# Patient Record
Sex: FEMALE | Race: ASIAN | NOT HISPANIC OR LATINO | URBAN - METROPOLITAN AREA
[De-identification: names, ages, dates, MRNs, and addresses within clinical notes are randomized per-mention and may not be internally consistent; named-entity substitution may affect disease eponyms.]

---
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
## 2021-06-08 NOTE — ED PEDIATRIC NURSE NOTE - CAS EDP DISCH TYPE
Appointment schedule with dr. perdomo for 6/22.  
New Appointment Needed  What is the reason for the visit:    Same Date/Next Day Appt Request  What is the reason for your visit?: Patient is in need of a diabetic check, the patient's sibling also just passed away last week, and the patient is worried about having the same cancer as their sibling. Also the patient stated they have really bad itching on their buttocks that will not stop. Patient would also like to have their BP checked.    Provider Preference: PCP only  How soon do you need to be seen?: as soon as possible  Waitlist offered?: No  Okay to leave a detailed message:  Yes    
Home

## 2024-03-22 ENCOUNTER — NON-APPOINTMENT (OUTPATIENT)
Age: 24
End: 2024-03-22

## 2024-04-01 ENCOUNTER — NON-APPOINTMENT (OUTPATIENT)
Age: 24
End: 2024-04-01

## 2024-04-01 ENCOUNTER — APPOINTMENT (OUTPATIENT)
Dept: GYNECOLOGIC ONCOLOGY | Facility: CLINIC | Age: 24
End: 2024-04-01
Payer: COMMERCIAL

## 2024-04-01 VITALS
WEIGHT: 103 LBS | DIASTOLIC BLOOD PRESSURE: 62 MMHG | OXYGEN SATURATION: 96 % | HEART RATE: 71 BPM | HEIGHT: 61 IN | SYSTOLIC BLOOD PRESSURE: 111 MMHG | TEMPERATURE: 97.5 F | BODY MASS INDEX: 19.45 KG/M2

## 2024-04-01 DIAGNOSIS — Z13.1 ENCOUNTER FOR SCREENING FOR DIABETES MELLITUS: ICD-10-CM

## 2024-04-01 DIAGNOSIS — Z01.818 ENCOUNTER FOR OTHER PREPROCEDURAL EXAMINATION: ICD-10-CM

## 2024-04-01 PROCEDURE — 99205 OFFICE O/P NEW HI 60 MIN: CPT

## 2024-04-01 NOTE — CHIEF COMPLAINT
[FreeTextEntry1] : New consult  23yo referred by Dr. Herring for a large dermoid cyst. Pt was c/o vagina l pruritis and had an incidental finding of ovarian cyst on routine US. denies abdominal pain, changes in bowel/urinary habits/bloating or decrease in appetite.    OBHX: none GYNHX: LMP 3/5/24. regular. STDs neg. denies abnormal pap, fibroids. + cysts  PMHX: none SX: none  MED: none ALL: NKDA  SOCIAL: social ETOH, no tobacco/drugs FAM: FGM: leukemia age 70's.   Last pap (no HPV testing): 10/2023- negative

## 2024-04-01 NOTE — PHYSICAL EXAM
[Chaperone Present] : A chaperone was present in the examining room during all aspects of the physical examination [Abnormal] : Adnexa(ae): Abnormal [Normal] : Recto-Vaginal Exam: Normal [de-identified] : 10cm ovarian cyst palpated anterior to the uterus. Nontender

## 2024-04-01 NOTE — HISTORY OF PRESENT ILLNESS
[FreeTextEntry1] : Problem: 1) LO cyst: 10cm likely Dermoid 2) AFP, CA 19-9, :  wnl  Previous Therapies; 1) TVUS 11/18/2023     a) Uterus 6.4 x 3.7 x 5.0cm EMS 0.5cm     b) RO 4.7cm     c) LO: ovary not identified. cystic mass with sizable soft tissue mural nodule with internal vascular flow 10.9 x 9.5 x 8.9cm  2) Pelvic MRI 12/24/23      a) anterior pelvis is a 10.3 x 9 x 8cm mass surrounded by a rind of ovarian tissue arising from LO containing macroscopic fat and hemorrhagic/proteinaceous material c/w Dermoid.   3) TVUS 3/22/24     a) Uterus 7.9 x 4.1cm     b) EMS 0.86cm     c) RO 4.4cm     d) LO: 10.8cm Dermoid cyst

## 2024-04-02 LAB
ALBUMIN SERPL ELPH-MCNC: 4.8 G/DL
ALP BLD-CCNC: 63 U/L
ALT SERPL-CCNC: 10 U/L
ANION GAP SERPL CALC-SCNC: 10 MMOL/L
APTT BLD: 35.6 SEC
AST SERPL-CCNC: 17 U/L
BASOPHILS # BLD AUTO: 0.04 K/UL
BASOPHILS NFR BLD AUTO: 0.8 %
BILIRUB SERPL-MCNC: 0.3 MG/DL
BUN SERPL-MCNC: 11 MG/DL
CALCIUM SERPL-MCNC: 9.6 MG/DL
CHLORIDE SERPL-SCNC: 103 MMOL/L
CO2 SERPL-SCNC: 26 MMOL/L
CREAT SERPL-MCNC: 0.58 MG/DL
EGFR: 130 ML/MIN/1.73M2
EOSINOPHIL # BLD AUTO: 0.15 K/UL
EOSINOPHIL NFR BLD AUTO: 3.1 %
ESTIMATED AVERAGE GLUCOSE: 114 MG/DL
GLUCOSE SERPL-MCNC: 92 MG/DL
HBA1C MFR BLD HPLC: 5.6 %
HCT VFR BLD CALC: 41.1 %
HGB BLD-MCNC: 13 G/DL
IMM GRANULOCYTES NFR BLD AUTO: 0 %
INR PPP: 1.08 RATIO
LYMPHOCYTES # BLD AUTO: 1.6 K/UL
LYMPHOCYTES NFR BLD AUTO: 32.8 %
MAN DIFF?: NORMAL
MCHC RBC-ENTMCNC: 27.7 PG
MCHC RBC-ENTMCNC: 31.6 GM/DL
MCV RBC AUTO: 87.4 FL
MONOCYTES # BLD AUTO: 0.23 K/UL
MONOCYTES NFR BLD AUTO: 4.7 %
NEUTROPHILS # BLD AUTO: 2.86 K/UL
NEUTROPHILS NFR BLD AUTO: 58.6 %
PLATELET # BLD AUTO: 354 K/UL
POTASSIUM SERPL-SCNC: 4.1 MMOL/L
PROT SERPL-MCNC: 7.4 G/DL
PT BLD: 12.2 SEC
RBC # BLD: 4.7 M/UL
RBC # FLD: 12.8 %
SODIUM SERPL-SCNC: 139 MMOL/L
WBC # FLD AUTO: 4.88 K/UL

## 2024-04-17 ENCOUNTER — TRANSCRIPTION ENCOUNTER (OUTPATIENT)
Age: 24
End: 2024-04-17

## 2024-04-17 VITALS
TEMPERATURE: 97 F | OXYGEN SATURATION: 100 % | HEART RATE: 54 BPM | WEIGHT: 103.84 LBS | SYSTOLIC BLOOD PRESSURE: 106 MMHG | DIASTOLIC BLOOD PRESSURE: 70 MMHG | HEIGHT: 61 IN | RESPIRATION RATE: 16 BRPM

## 2024-04-17 RX ORDER — OXYCODONE AND ACETAMINOPHEN 5; 325 MG/1; MG/1
5-325 TABLET ORAL
Qty: 10 | Refills: 0 | Status: ACTIVE | COMMUNITY
Start: 2024-04-17 | End: 1900-01-01

## 2024-04-17 RX ORDER — DOCUSATE SODIUM 100 MG/1
100 CAPSULE, LIQUID FILLED ORAL TWICE DAILY
Qty: 30 | Refills: 0 | Status: ACTIVE | COMMUNITY
Start: 2024-04-17 | End: 1900-01-01

## 2024-04-17 RX ORDER — IBUPROFEN 800 MG/1
800 TABLET, FILM COATED ORAL EVERY 6 HOURS
Qty: 40 | Refills: 2 | Status: ACTIVE | COMMUNITY
Start: 2024-04-17 | End: 1900-01-01

## 2024-04-17 NOTE — ASU PATIENT PROFILE, ADULT - REASON FOR ADMISSION, PROFILE
laparoscopic ovarian cystectomy possible oophorecotmy and staging- left laparoscopic ovarian cystectomy possible oophorectomy and staging- left

## 2024-04-17 NOTE — ASU PATIENT PROFILE, ADULT - FALL HARM RISK - UNIVERSAL INTERVENTIONS
Bed in lowest position, wheels locked, appropriate side rails in place/Call bell, personal items and telephone in reach/Instruct patient to call for assistance before getting out of bed or chair/Non-slip footwear when patient is out of bed/[place] to call system/Physically safe environment - no spills, clutter or unnecessary equipment/Purposeful Proactive Rounding/Room/bathroom lighting operational, light cord in reach

## 2024-04-18 ENCOUNTER — TRANSCRIPTION ENCOUNTER (OUTPATIENT)
Age: 24
End: 2024-04-18

## 2024-04-18 ENCOUNTER — RESULT REVIEW (OUTPATIENT)
Age: 24
End: 2024-04-18

## 2024-04-18 ENCOUNTER — OUTPATIENT (OUTPATIENT)
Dept: OUTPATIENT SERVICES | Facility: HOSPITAL | Age: 24
LOS: 1 days | Discharge: HOME | End: 2024-04-18
Payer: COMMERCIAL

## 2024-04-18 ENCOUNTER — APPOINTMENT (OUTPATIENT)
Dept: GYNECOLOGIC ONCOLOGY | Facility: HOSPITAL | Age: 24
End: 2024-04-18

## 2024-04-18 VITALS — TEMPERATURE: 98 F

## 2024-04-18 LAB
BLD GP AB SCN SERPL QL: NEGATIVE — SIGNIFICANT CHANGE UP
RH IG SCN BLD-IMP: POSITIVE — SIGNIFICANT CHANGE UP

## 2024-04-18 PROCEDURE — 86850 RBC ANTIBODY SCREEN: CPT

## 2024-04-18 PROCEDURE — 58661 LAPAROSCOPY REMOVE ADNEXA: CPT

## 2024-04-18 PROCEDURE — 86901 BLOOD TYPING SEROLOGIC RH(D): CPT

## 2024-04-18 PROCEDURE — 88112 CYTOPATH CELL ENHANCE TECH: CPT | Mod: 26

## 2024-04-18 PROCEDURE — C1889: CPT

## 2024-04-18 PROCEDURE — 88305 TISSUE EXAM BY PATHOLOGIST: CPT | Mod: 26

## 2024-04-18 PROCEDURE — 58662 LAPAROSCOPY EXCISE LESIONS: CPT

## 2024-04-18 PROCEDURE — 88305 TISSUE EXAM BY PATHOLOGIST: CPT

## 2024-04-18 PROCEDURE — 88305 TISSUE EXAM BY PATHOLOGIST: CPT | Mod: 26,59

## 2024-04-18 PROCEDURE — 88112 CYTOPATH CELL ENHANCE TECH: CPT

## 2024-04-18 PROCEDURE — 86900 BLOOD TYPING SEROLOGIC ABO: CPT

## 2024-04-18 DEVICE — SURGIFLO HEMOSTATIC MATRIX KIT: Type: IMPLANTABLE DEVICE | Status: FUNCTIONAL

## 2024-04-18 RX ORDER — PANTOPRAZOLE SODIUM 20 MG/1
20 TABLET, DELAYED RELEASE ORAL DAILY
Refills: 0 | Status: DISCONTINUED | OUTPATIENT
Start: 2024-04-18 | End: 2024-04-18

## 2024-04-18 RX ORDER — METOCLOPRAMIDE HCL 10 MG
10 TABLET ORAL EVERY 8 HOURS
Refills: 0 | Status: DISCONTINUED | OUTPATIENT
Start: 2024-04-18 | End: 2024-04-18

## 2024-04-18 RX ORDER — OXYCODONE HYDROCHLORIDE 5 MG/1
5 TABLET ORAL EVERY 4 HOURS
Refills: 0 | Status: DISCONTINUED | OUTPATIENT
Start: 2024-04-18 | End: 2024-04-18

## 2024-04-18 RX ORDER — KETOROLAC TROMETHAMINE 30 MG/ML
30 SYRINGE (ML) INJECTION EVERY 6 HOURS
Refills: 0 | Status: DISCONTINUED | OUTPATIENT
Start: 2024-04-18 | End: 2024-04-18

## 2024-04-18 RX ORDER — SIMETHICONE 80 MG/1
80 TABLET, CHEWABLE ORAL EVERY 6 HOURS
Refills: 0 | Status: DISCONTINUED | OUTPATIENT
Start: 2024-04-18 | End: 2024-04-18

## 2024-04-18 RX ORDER — ACETAMINOPHEN 500 MG
1000 TABLET ORAL ONCE
Refills: 0 | Status: COMPLETED | OUTPATIENT
Start: 2024-04-18 | End: 2024-04-18

## 2024-04-18 RX ORDER — SODIUM CHLORIDE 9 MG/ML
1000 INJECTION, SOLUTION INTRAVENOUS
Refills: 0 | Status: DISCONTINUED | OUTPATIENT
Start: 2024-04-18 | End: 2024-04-18

## 2024-04-18 RX ORDER — ONDANSETRON 8 MG/1
8 TABLET, FILM COATED ORAL EVERY 8 HOURS
Refills: 0 | Status: DISCONTINUED | OUTPATIENT
Start: 2024-04-18 | End: 2024-04-18

## 2024-04-18 RX ORDER — HYDROMORPHONE HYDROCHLORIDE 2 MG/ML
0.5 INJECTION INTRAMUSCULAR; INTRAVENOUS; SUBCUTANEOUS
Refills: 0 | Status: DISCONTINUED | OUTPATIENT
Start: 2024-04-18 | End: 2024-04-18

## 2024-04-18 RX ORDER — CELECOXIB 200 MG/1
400 CAPSULE ORAL ONCE
Refills: 0 | Status: COMPLETED | OUTPATIENT
Start: 2024-04-18 | End: 2024-04-18

## 2024-04-18 RX ORDER — OXYCODONE HYDROCHLORIDE 5 MG/1
10 TABLET ORAL EVERY 4 HOURS
Refills: 0 | Status: DISCONTINUED | OUTPATIENT
Start: 2024-04-18 | End: 2024-04-18

## 2024-04-18 RX ORDER — ACETAMINOPHEN 500 MG
1000 TABLET ORAL EVERY 6 HOURS
Refills: 0 | Status: DISCONTINUED | OUTPATIENT
Start: 2024-04-18 | End: 2024-04-18

## 2024-04-18 RX ORDER — ACETAMINOPHEN 500 MG
2 TABLET ORAL
Qty: 0 | Refills: 0 | DISCHARGE
Start: 2024-04-18

## 2024-04-18 RX ADMIN — Medication 1000 MILLIGRAM(S): at 06:51

## 2024-04-18 RX ADMIN — HYDROMORPHONE HYDROCHLORIDE 0.5 MILLIGRAM(S): 2 INJECTION INTRAMUSCULAR; INTRAVENOUS; SUBCUTANEOUS at 10:33

## 2024-04-18 RX ADMIN — PANTOPRAZOLE SODIUM 20 MILLIGRAM(S): 20 TABLET, DELAYED RELEASE ORAL at 13:45

## 2024-04-18 RX ADMIN — CELECOXIB 400 MILLIGRAM(S): 200 CAPSULE ORAL at 06:51

## 2024-04-18 RX ADMIN — Medication 30 MILLIGRAM(S): at 13:45

## 2024-04-18 RX ADMIN — Medication 1000 MILLIGRAM(S): at 13:45

## 2024-04-18 RX ADMIN — SODIUM CHLORIDE 125 MILLILITER(S): 9 INJECTION, SOLUTION INTRAVENOUS at 10:33

## 2024-04-18 NOTE — ASU DISCHARGE PLAN (ADULT/PEDIATRIC) - NS MD DC FALL RISK RISK
For information on Fall & Injury Prevention, visit: [email or web address] OR  [email or web address] OR  https://www.cdc.gov/steadi/patient.html

## 2024-04-18 NOTE — PROGRESS NOTE ADULT - SUBJECTIVE AND OBJECTIVE BOX
GYN Post Op Check     Patient seen at bedside.  Pain controlled. Tolerating sips and crackers. OOB to bathroom. Voiding    Denies CP, SOB, fever, chills, nausea, vomiting.    Vital Signs Last 24 Hrs  T(C): 36.6 (18 Apr 2024 13:36), Max: 36.6 (18 Apr 2024 13:36)  T(F): 97.8 (18 Apr 2024 13:36), Max: 97.8 (18 Apr 2024 13:36)  HR: 65 (18 Apr 2024 12:36) (49 - 69)  BP: 104/59 (18 Apr 2024 12:36) (104/59 - 119/69)  BP(mean): 77 (18 Apr 2024 12:36) (76 - 89)  RR: 18 (18 Apr 2024 12:36) (13 - 22)  SpO2: 96% (18 Apr 2024 12:36) (96% - 100%)    Parameters below as of 18 Apr 2024 12:36  Patient On (Oxygen Delivery Method): room air        Physical Exam:  Gen: Well-appearing. NAD.  Resp: Breathing comfortably on RA.  Abd: Soft, appropriately tender, non-distended, no rebound, no guarding. Dermabond C/D/I.  Ext: No calf tenderness.    I&O's Summary    18 Apr 2024 07:01  -  18 Apr 2024 14:52  --------------------------------------------------------  IN: 1445 mL / OUT: 425 mL / NET: 1020 mL      MEDICATIONS  (STANDING):  acetaminophen     Tablet .. 1000 milliGRAM(s) Oral every 6 hours  ketorolac   Injectable 30 milliGRAM(s) IV Push every 6 hours  lactated ringers. 1000 milliLiter(s) (125 mL/Hr) IV Continuous <Continuous>  pantoprazole  Injectable 20 milliGRAM(s) IV Push daily    MEDICATIONS  (PRN):  HYDROmorphone  Injectable 0.5 milliGRAM(s) IV Push every 15 minutes PRN Severe Pain (7 - 10)  metoclopramide Injectable 10 milliGRAM(s) IV Push every 8 hours PRN Nausea and/or Vomiting  ondansetron Injectable 8 milliGRAM(s) IV Push every 8 hours PRN Nausea and/or Vomiting  oxyCODONE    IR 5 milliGRAM(s) Oral every 4 hours PRN Moderate Pain (4 - 6)  oxyCODONE    IR 10 milliGRAM(s) Oral every 4 hours PRN Severe Pain (7 - 10)  simethicone 80 milliGRAM(s) Chew every 6 hours PRN Gas    Allergies    No Known Allergies    Intolerances        LABS:

## 2024-04-18 NOTE — PROGRESS NOTE ADULT - ASSESSMENT
24y y.o. F POD#0 s/p l/s LO cystectomy, meeting postoperative milestones.    Plan:  Neuro: Tylenol/toradol ATC, oxycodone PRN severe pain  CV: VSS.  Resp: On RA. Encourage IS  GI/FEN: Regular diet as tolerated. Zofran/Reglan PRN nausea. Simethicone PRN gas pain.  : Voiding  ID: Afebrile  DVT ppx: Ambulate as tolerated  Activity: Out of bed      Frida Johnson MD PGY4

## 2024-04-18 NOTE — ASU DISCHARGE PLAN (ADULT/PEDIATRIC) - CARE PROVIDER_API CALL
Alison Venegas.  Gynecologic Oncology  [address]  [place] [address]  Phone: [phone]  Fax: [phone]  Follow Up Time:

## 2024-04-18 NOTE — BRIEF OPERATIVE NOTE - OPERATION/FINDINGS
s/p l/s left ovarian cystectomy, pelvic washings. 10cm left ovarian dermoid cyst, excised intact. Normal appearing left and right fallopian tube, normal appearing right ovary and uterus. Fascia closed w/ 0 vicryl at the umbilicus. Skin closed w/ 4-0 monocryl.

## 2024-04-19 LAB — NON-GYNECOLOGICAL CYTOLOGY STUDY: SIGNIFICANT CHANGE UP

## 2024-04-25 ENCOUNTER — APPOINTMENT (OUTPATIENT)
Dept: GYNECOLOGIC ONCOLOGY | Facility: CLINIC | Age: 24
End: 2024-04-25
Payer: COMMERCIAL

## 2024-04-25 VITALS
HEIGHT: 61 IN | SYSTOLIC BLOOD PRESSURE: 107 MMHG | WEIGHT: 103 LBS | TEMPERATURE: 97.3 F | DIASTOLIC BLOOD PRESSURE: 73 MMHG | HEART RATE: 71 BPM | OXYGEN SATURATION: 100 % | BODY MASS INDEX: 19.45 KG/M2

## 2024-04-25 PROCEDURE — 99024 POSTOP FOLLOW-UP VISIT: CPT

## 2024-04-25 NOTE — ASSESSMENT
[FreeTextEntry1] : s/p Lpsc left ovarian cystectomy not optimal pain control. otherwise meeting post-op milestones  [] d/w pt pain medication strategy with ibuprofen/tylenol and increase ambulation.  [] post-op precautions given [] f/u pathology [] f/u with Dr. Venegas in 3 weeks

## 2024-04-25 NOTE — PHYSICAL EXAM
[Normal] : No focal neurologic defects observed [de-identified] : incision sites c/d/i. Soft, mild LLQ tenderness with voluntary guarding and no rebound.  [Fully active, able to carry on all pre-disease performance without restriction] : Status 0 - Fully active, able to carry on all pre-disease performance without restriction

## 2024-04-25 NOTE — HISTORY OF PRESENT ILLNESS
[FreeTextEntry1] : Problem: 1) LO cyst: 10cm likely Dermoid 2) AFP, CA 19-9, :  wnl  Previous Therapies; 1) TVUS 11/18/2023     a) Uterus 6.4 x 3.7 x 5.0cm EMS 0.5cm     b) RO 4.7cm     c) LO: ovary not identified. cystic mass with sizable soft tissue mural nodule with internal vascular flow 10.9 x 9.5 x 8.9cm  2) Pelvic MRI 12/24/23      a) anterior pelvis is a 10.3 x 9 x 8cm mass surrounded by a rind of ovarian tissue arising from LO containing macroscopic fat and hemorrhagic/proteinaceous material c/w Dermoid.   3) TVUS 3/22/24     a) Uterus 7.9 x 4.1cm     b) EMS 0.86cm     c) RO 4.4cm     d) LO: 10.8cm Dermoid cyst  4) s/p Laparoscopic left ovarian cystectomy 4/18/24     a)

## 2024-04-25 NOTE — REASON FOR VISIT
[FreeTextEntry1] : 1 week post-op   23yo s/p Lpsc left ovarian cystectomy here for 1 week post-op visit. Pt took 1 ibuprofen yesterday and none today but doesn't look as if pain is well controlled. +BMs.

## 2024-04-26 LAB — SURGICAL PATHOLOGY STUDY: SIGNIFICANT CHANGE UP

## 2024-05-21 ENCOUNTER — APPOINTMENT (OUTPATIENT)
Dept: GYNECOLOGIC ONCOLOGY | Facility: CLINIC | Age: 24
End: 2024-05-21

## 2024-05-21 VITALS
DIASTOLIC BLOOD PRESSURE: 78 MMHG | HEART RATE: 66 BPM | HEIGHT: 61 IN | SYSTOLIC BLOOD PRESSURE: 118 MMHG | OXYGEN SATURATION: 91 % | BODY MASS INDEX: 19.83 KG/M2 | TEMPERATURE: 97.8 F | WEIGHT: 105 LBS

## 2024-05-21 DIAGNOSIS — D27.1 BENIGN NEOPLASM OF LEFT OVARY: ICD-10-CM

## 2024-05-21 PROCEDURE — 99213 OFFICE O/P EST LOW 20 MIN: CPT

## 2024-05-21 NOTE — REASON FOR VISIT
[FreeTextEntry1] : 1 Month post-op   23yo s/p Lpsc left ovarian cystectomy here for 1 month post-op visit. She had a "tummy ache" last week after drinking alcohol that self resolved. Endorses normal diet, BMs. Denies nausea/vomiting.  Patient endorses increased discharge since December, unrelated to the procedure.

## 2024-05-21 NOTE — ASSESSMENT
[FreeTextEntry1] : Appropriate 1 month recovery  Abdominal symptoms now resolved and most likely related to reintroduction of alcohol or certain foods to her diet. Instructed to let me know if this pain returns or becomes more persistent.  No abnormal discharge noted today. Patient encouraged to take probiotics and inform her gyn if the discharge becomes malodorous or pruritic.   Pathology benign  Follow up with Gyn for routine care  Reconsult as needed.

## 2024-05-21 NOTE — PHYSICAL EXAM
[Chaperone Present] : A chaperone was present in the examining room during all aspects of the physical examination [[id number]] : A chaperone was present during the pelvic exam. [Normal] : Anus and perineum: Normal sphincter tone, no masses, no prolapse. [de-identified] : incision c/d/i

## 2024-05-21 NOTE — HISTORY OF PRESENT ILLNESS
[FreeTextEntry1] : Problem: 1) LO cyst: 10cm likely Dermoid 2) AFP, CA 19-9, :  wnl  Previous Therapies; 1) TVUS 11/18/2023     a) Uterus 6.4 x 3.7 x 5.0cm EMS 0.5cm     b) RO 4.7cm     c) LO: ovary not identified. cystic mass with sizable soft tissue mural nodule with internal vascular flow 10.9 x 9.5 x 8.9cm  2) Pelvic MRI 12/24/23      a) anterior pelvis is a 10.3 x 9 x 8cm mass surrounded by a rind of ovarian tissue arising from LO containing macroscopic fat and hemorrhagic/proteinaceous material c/w Dermoid.   3) TVUS 3/22/24     a) Uterus 7.9 x 4.1cm     b) EMS 0.86cm     c) RO 4.4cm     d) LO: 10.8cm Dermoid cyst  4) s/p Laparoscopic left ovarian cystectomy 4/18/24     a)  Mature cystic teratoma.
